# Patient Record
Sex: FEMALE | Race: WHITE | Employment: OTHER | URBAN - METROPOLITAN AREA
[De-identification: names, ages, dates, MRNs, and addresses within clinical notes are randomized per-mention and may not be internally consistent; named-entity substitution may affect disease eponyms.]

---

## 2017-01-05 ENCOUNTER — MED REC SCAN ONLY (OUTPATIENT)
Dept: INTERNAL MEDICINE CLINIC | Facility: CLINIC | Age: 77
End: 2017-01-05

## 2017-01-24 ENCOUNTER — APPOINTMENT (OUTPATIENT)
Dept: CT IMAGING | Facility: HOSPITAL | Age: 77
End: 2017-01-24
Attending: PHYSICIAN ASSISTANT
Payer: MEDICARE

## 2017-01-24 ENCOUNTER — HOSPITAL ENCOUNTER (EMERGENCY)
Facility: HOSPITAL | Age: 77
Discharge: HOME | End: 2017-01-24
Attending: EMERGENCY MEDICINE
Payer: MEDICARE

## 2017-01-24 VITALS
HEART RATE: 86 BPM | HEIGHT: 65 IN | WEIGHT: 167.56 LBS | RESPIRATION RATE: 18 BRPM | TEMPERATURE: 98 F | DIASTOLIC BLOOD PRESSURE: 72 MMHG | BODY MASS INDEX: 27.92 KG/M2 | SYSTOLIC BLOOD PRESSURE: 191 MMHG | OXYGEN SATURATION: 96 %

## 2017-01-24 DIAGNOSIS — S02.2XXA CLOSED FRACTURE OF NASAL BONE, INITIAL ENCOUNTER: Primary | ICD-10-CM

## 2017-01-24 DIAGNOSIS — S00.83XA FACIAL CONTUSION, INITIAL ENCOUNTER: ICD-10-CM

## 2017-01-24 DIAGNOSIS — S01.21XA NASAL LACERATION, INITIAL ENCOUNTER: ICD-10-CM

## 2017-01-24 DIAGNOSIS — S01.511A LIP LACERATION, INITIAL ENCOUNTER: ICD-10-CM

## 2017-01-24 PROCEDURE — 12013 RPR F/E/E/N/L/M 2.6-5.0 CM: CPT

## 2017-01-24 PROCEDURE — 76377 3D RENDER W/INTRP POSTPROCES: CPT

## 2017-01-24 PROCEDURE — 96375 TX/PRO/DX INJ NEW DRUG ADDON: CPT

## 2017-01-24 PROCEDURE — 96376 TX/PRO/DX INJ SAME DRUG ADON: CPT

## 2017-01-24 PROCEDURE — 72125 CT NECK SPINE W/O DYE: CPT

## 2017-01-24 PROCEDURE — 21310 HC CLOSED TX NASAL BONE FX WITHOUT MANIPULATION: CPT

## 2017-01-24 PROCEDURE — 70450 CT HEAD/BRAIN W/O DYE: CPT

## 2017-01-24 PROCEDURE — 21310 PR CLOSED TX NASAL BONE FX WITHOUT MANIPULATION: CPT

## 2017-01-24 PROCEDURE — 96374 THER/PROPH/DIAG INJ IV PUSH: CPT

## 2017-01-24 PROCEDURE — 99285 EMERGENCY DEPT VISIT HI MDM: CPT

## 2017-01-24 PROCEDURE — 99284 EMERGENCY DEPT VISIT MOD MDM: CPT

## 2017-01-24 PROCEDURE — 70486 CT MAXILLOFACIAL W/O DYE: CPT

## 2017-01-24 RX ORDER — MORPHINE SULFATE 4 MG/ML
4 INJECTION, SOLUTION INTRAMUSCULAR; INTRAVENOUS ONCE
Status: COMPLETED | OUTPATIENT
Start: 2017-01-24 | End: 2017-01-24

## 2017-01-24 RX ORDER — HYDROCODONE BITARTRATE AND ACETAMINOPHEN 10; 325 MG/1; MG/1
1-2 TABLET ORAL EVERY 4 HOURS PRN
Qty: 20 TABLET | Refills: 0 | Status: SHIPPED | OUTPATIENT
Start: 2017-01-24 | End: 2017-01-31

## 2017-01-24 RX ORDER — ONDANSETRON 2 MG/ML
4 INJECTION INTRAMUSCULAR; INTRAVENOUS ONCE
Status: COMPLETED | OUTPATIENT
Start: 2017-01-24 | End: 2017-01-24

## 2017-01-24 RX ORDER — MORPHINE SULFATE 2 MG/ML
2 INJECTION, SOLUTION INTRAMUSCULAR; INTRAVENOUS ONCE
Status: COMPLETED | OUTPATIENT
Start: 2017-01-24 | End: 2017-01-24

## 2017-01-24 RX ORDER — MORPHINE SULFATE 4 MG/ML
INJECTION, SOLUTION INTRAMUSCULAR; INTRAVENOUS
Status: DISCONTINUED
Start: 2017-01-24 | End: 2017-01-24

## 2017-01-24 RX ORDER — CEPHALEXIN 500 MG/1
500 CAPSULE ORAL 3 TIMES DAILY
Qty: 21 CAPSULE | Refills: 0 | Status: SHIPPED | OUTPATIENT
Start: 2017-01-24 | End: 2017-02-14

## 2017-01-24 RX ORDER — MORPHINE SULFATE 2 MG/ML
INJECTION, SOLUTION INTRAMUSCULAR; INTRAVENOUS
Status: DISCONTINUED
Start: 2017-01-24 | End: 2017-01-24

## 2017-01-24 RX ORDER — ONDANSETRON 2 MG/ML
INJECTION INTRAMUSCULAR; INTRAVENOUS
Status: DISCONTINUED
Start: 2017-01-24 | End: 2017-01-24

## 2017-01-24 NOTE — ED PROVIDER NOTES
Patient Seen in: [facility] Emergency Department    History   Patient presents with:  Fall (musculoskeletal, neurologic)    Stated Complaint: fall    HPI    Patient is a 56-year-old female.   Just prior to arrival, patient was attempting to ambulate Comment Surgeries for endometriosis    DELAY/SECTN FLAP LID,NOS,EAR,LIP N/A 5/2/2014    Comment Procedure: DIVISION AND INSET FOREHEAD FLAP;  Surgeon: Galilea Ivy MD;  Location: [address] 10.1-30 Right 5/2/201 carbidopa-levodopa (SINEMET)  MG Oral Tab,  Take 1 tablet by mouth 3 (three) times daily. AZILECT 0.5 MG Oral Tab,  Take 1 mg by mouth daily. VESICARE 10 MG Oral Tab,  Take 10 mg by mouth nightly.        Family History   Problem Relation Age of On or sign of infection. Neuro: Cranial nerves intact    ED Course   Labs Reviewed - No data to display  Ct Brain Or Head (82338)    1/24/2017  PROCEDURE:  CT OF THE BRAIN WITHOUT CONTRAST  COMPARISON:  None.   INDICATIONS:  fall  TECHNIQUE:  Noncontrast CT Dose information is transmitted to the  [facility] of Radiology) NRDR ([place] [address]) which includes the Dose Index Registry. PATIENT STATED HISTORY:  Patient tripped and fell into her table.   She was unsteady due to her histo techniques were used. Dose information is transmitted to the [employer] of Radiology) NRDR ([address]) which includes the Dose Index Registry. PATIENT STATED HISTORY:  Patient tripped and fell into her table.   She was uns scrubbed. Using 1% lidocaine with epinephrine, local injections were performed. 1.5 cc total utilized. Using 5-0 Vicryl, 3 simple interrupted sutures were placed. No tail. Edges were well approximated.   There is partial avulsion of the vermilion borde

## 2017-01-24 NOTE — ED INITIAL ASSESSMENT (HPI)
Patient to ED via EMS. Reports she was walking, was unsteady d/t her Parkinsons, fell and hit her face on her dining room table.   Patient walked across the porter to friends apartment and waited there for ambulance.  + nose pain, right clavicle pain, left k

## 2017-01-25 NOTE — ED NOTES
Assumed care, pt resting in bed, family at bedside. She is in no apparent distress. States, \"pain is OK. \" When asked if vision is OK, \"I can see them, my family here. \" NICOLLE Steiner at bedside answering family questions

## 2017-01-25 NOTE — ED PROVIDER NOTES
Patient Seen in: [facility] Emergency Department    History   Patient presents with:  Fall (musculoskeletal, neurologic)    Stated Complaint: fall    HPI    43-year-old female with a history of Parkinson's, sleep apnea, hyperlipidemia, basal cell ca EGD for GERD    OTHER SURGICAL HISTORY  [place]    Comment Partial colectomy for diverticulitis    OTHER SURGICAL HISTORY      Comment Surgeries for endometriosis    DELAY/SECTN FLAP LID,NOS,EAR,LIP N/A 5/2/2014    Comment Procedure: DIVISION AND INSET Elma Landrum Calcium (CRESTOR) 5 MG Oral Tab,  Take 5 mg by mouth nightly. Polyethyl Glycol-Propyl Glycol (SYSTANE OP),  Apply  to eye.   carbidopa-levodopa (SINEMET)  MG Oral Tab,  Take 1 tablet by mouth 3 (three) times daily.    AZILECT 0.5 MG Oral Tab,  Take membranes are clear without evidence of injection or perforation. Neck exam: Supple. There is no lymphadenopathy or carotid bruit. Cardiovascular exam: Regular rate and rhythm. There are no murmurs, rubs, gallops.   Lungs: Clear to auscultation bilatera acute inflammation. SKULL:             No calvarial fracture. However, comminuted bilateral nasal bone fractures with displacement of small fracture fragments. OTHER:             Atherosclerosis. Soft tissue swelling overlies the left frontal calvarium. changes present. For example, the temporomandibular joints bilaterally are narrowed with flattening of the condyles. Small well-corticated ossifications anterior to the left TMJ may represent prior trauma, correlate clinically.   There is soft tissue swelli 1/24/2017 at 17:44     Approved by: Valentina Grant MD              Patient C-spine collar was removed. Patient had a sepsis of her wounds. She had topical lidocaine applied. Please refer to the physician assistant's note for suture repair.     Patient noted

## 2017-02-14 ENCOUNTER — SNF VISIT (OUTPATIENT)
Dept: INTERNAL MEDICINE CLINIC | Facility: CLINIC | Age: 77
End: 2017-02-14

## 2017-02-14 DIAGNOSIS — I95.1 ORTHOSTATIC HYPOTENSION: ICD-10-CM

## 2017-02-14 DIAGNOSIS — K21.9 GASTROESOPHAGEAL REFLUX DISEASE, ESOPHAGITIS PRESENCE NOT SPECIFIED: ICD-10-CM

## 2017-02-14 DIAGNOSIS — F32.A DEPRESSION, UNSPECIFIED DEPRESSION TYPE: ICD-10-CM
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
## 2017-02-14 DIAGNOSIS — E03.9 ACQUIRED HYPOTHYROIDISM: ICD-10-CM

## 2017-02-14 DIAGNOSIS — G20 PARKINSON DISEASE (HCC): Primary | ICD-10-CM

## 2017-02-14 PROCEDURE — 99306 1ST NF CARE HIGH MDM 50: CPT | Performed by: INTERNAL MEDICINE

## 2017-02-14 RX ORDER — RIVASTIGMINE 9.5 MG/24H
1 PATCH, EXTENDED RELEASE TRANSDERMAL DAILY
COMMUNITY

## 2017-02-14 RX ORDER — PYRIDOSTIGMINE BROMIDE 60 MG/1
30 TABLET ORAL 2 TIMES DAILY
COMMUNITY

## 2017-02-14 RX ORDER — BUPROPION HYDROCHLORIDE 150 MG/1
150 TABLET ORAL DAILY
COMMUNITY

## 2017-02-14 RX ORDER — SODIUM CHLORIDE 1000 MG
1 TABLET, SOLUBLE MISCELLANEOUS 2 TIMES DAILY WITH MEALS
COMMUNITY
End: 2020-01-30 | Stop reason: ALTCHOICE

## 2017-02-14 NOTE — PROGRESS NOTES
Patient admitted to the [facility] today for the second floor enriched living. She typically resides at [facility] in independent living.   The patient has a history of Parkinson's disease and has had multiple falls in addition to orthostatic hypotension acute distress, mental status at baseline, not short of breath  Skin: Warm, dry, intact no rashes visualized  HEENT: Normocephalic, she has a scar present over the bridge of her nose.   No pharyngeal erythema or exudate  Neck: No lymphadenopathy or thyromeg

## 2017-03-16 ENCOUNTER — SNF VISIT (OUTPATIENT)
Dept: INTERNAL MEDICINE CLINIC | Facility: CLINIC | Age: 77
End: 2017-03-16

## 2017-03-16 DIAGNOSIS — R35.1 NOCTURIA: ICD-10-CM

## 2017-03-16 DIAGNOSIS — G20 PARKINSON DISEASE (HCC): Primary | ICD-10-CM

## 2017-03-16 DIAGNOSIS — I95.1 ORTHOSTATIC HYPOTENSION: ICD-10-CM

## 2017-03-16 DIAGNOSIS — F32.A DEPRESSION, UNSPECIFIED DEPRESSION TYPE: ICD-10-CM

## 2017-03-16 PROCEDURE — 99307 SBSQ NF CARE SF MDM 10: CPT | Performed by: INTERNAL MEDICINE

## 2017-03-16 NOTE — PROGRESS NOTES
Patient was seen in follow-up for her Parkinson's disease, orthostatic hypotension, urinary incontinence and nocturia, depression, and recurrent falls. The patient was currently sitting out in the dining area.   She was recently switched from [address] [place] [address]

## 2017-04-20 ENCOUNTER — SNF VISIT (OUTPATIENT)
Dept: INTERNAL MEDICINE CLINIC | Facility: CLINIC | Age: 77
End: 2017-04-20

## 2017-04-20 DIAGNOSIS — G20 PARKINSON DISEASE (HCC): Primary | ICD-10-CM

## 2017-04-20 DIAGNOSIS — F32.A DEPRESSION, UNSPECIFIED DEPRESSION TYPE: ICD-10-CM

## 2017-04-20 DIAGNOSIS — I95.1 ORTHOSTATIC HYPOTENSION: ICD-10-CM

## 2017-04-20 PROCEDURE — 99308 SBSQ NF CARE LOW MDM 20: CPT | Performed by: INTERNAL MEDICINE

## 2017-04-20 NOTE — PROGRESS NOTES
Patient was seen in follow-up for her Parkinson's disease, recurrent falls, depression, and orthostatic hypotension. She was currently sitting up in a chair with her legs resting on her Rollator and she was asleep. She was easily aroused to voice.   She f

## 2017-04-24 ENCOUNTER — SNF VISIT (OUTPATIENT)
Dept: INTERNAL MEDICINE CLINIC | Facility: CLINIC | Age: 77
End: 2017-04-24

## 2017-04-24 DIAGNOSIS — N30.00 ACUTE CYSTITIS WITHOUT HEMATURIA: ICD-10-CM

## 2017-04-24 DIAGNOSIS — S09.90XA CLOSED HEAD INJURY, INITIAL ENCOUNTER: ICD-10-CM
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
## 2017-04-24 PROCEDURE — 99310 SBSQ NF CARE HIGH MDM 45: CPT | Performed by: INTERNAL MEDICINE

## 2017-04-24 NOTE — PROGRESS NOTES
Came to the floor immediately after the patient apparently fell in her room striking the back of her head on the floor. She is fallen approximately 6 times in the past week.   She did have a urine specimen obtained and the culture came back sometime last e

## 2017-05-12 ENCOUNTER — SNF VISIT (OUTPATIENT)
Dept: INTERNAL MEDICINE CLINIC | Facility: CLINIC | Age: 77
End: 2017-05-12

## 2017-05-12 DIAGNOSIS — M70.21 OLECRANON BURSITIS OF RIGHT ELBOW: Primary | ICD-10-CM

## 2017-05-12 NOTE — PROGRESS NOTES
Asked by nursing to evaluate the patient's right elbow which was swollen. No trauma or injury noted. Patient was sitting in her recliner. She does have swelling present over the right olecranon.   There is no warmth redness or tenderness to palpation of

## 2017-10-20 ENCOUNTER — HOSPITAL ENCOUNTER (EMERGENCY)
Facility: HOSPITAL | Age: 77
Discharge: HOME | End: 2017-10-20
Attending: EMERGENCY MEDICINE
Payer: MEDICARE

## 2017-10-20 ENCOUNTER — APPOINTMENT (OUTPATIENT)
Dept: CT IMAGING | Facility: HOSPITAL | Age: 77
End: 2017-10-20
Attending: EMERGENCY MEDICINE
Payer: MEDICARE

## 2017-10-20 VITALS
HEART RATE: 83 BPM | SYSTOLIC BLOOD PRESSURE: 136 MMHG | DIASTOLIC BLOOD PRESSURE: 59 MMHG | OXYGEN SATURATION: 97 % | RESPIRATION RATE: 18 BRPM | TEMPERATURE: 97 F
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
## 2017-10-20 DIAGNOSIS — S00.03XA CONTUSION OF SCALP, INITIAL ENCOUNTER: ICD-10-CM
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
## 2017-10-20 PROCEDURE — 70450 CT HEAD/BRAIN W/O DYE: CPT | Performed by: EMERGENCY MEDICINE

## 2017-10-20 PROCEDURE — 96374 THER/PROPH/DIAG INJ IV PUSH: CPT

## 2017-10-20 PROCEDURE — 80053 COMPREHEN METABOLIC PANEL: CPT | Performed by: EMERGENCY MEDICINE

## 2017-10-20 PROCEDURE — 99284 EMERGENCY DEPT VISIT MOD MDM: CPT

## 2017-10-20 PROCEDURE — 85025 COMPLETE CBC W/AUTO DIFF WBC: CPT | Performed by: EMERGENCY MEDICINE

## 2017-10-20 RX ORDER — ONDANSETRON 2 MG/ML
4 INJECTION INTRAMUSCULAR; INTRAVENOUS ONCE
Status: COMPLETED | OUTPATIENT
Start: 2017-10-20 | End: 2017-10-20

## 2017-10-20 NOTE — ED PROVIDER NOTES
Patient Seen in: [facility] Emergency Department    History   Patient presents with:  Headache (neurologic)  Hypertension (cardiovascular)  Contusion (musculoskeletal)    Stated Complaint: fall/ ha htn    HPI    Patient is a pleasant 61-year-old fem HAVE EXPERIENCED ANY* Right      Comment: Procedure: TISSUE REARRANGEMENT CHEEK;                 Surgeon: Cande Snider MD;  Location: [address] [place] [address]  5/2/2014: PATIENT WITH PREOPERATIVE ORDER FOR IV ANTIBIO* Right      Comment: P cart, in no apparent distress. HEENT: Mid forehead contusion. Extraocular muscles are intact. Pupils are equal and reactive to light. Oropharynx is pink and moist.  NECK: Neck is supple and nontender. The trachea is midline.    LUNGS: Lungs are clear to a techniques were used. Dose information is transmitted to the [facility] [employer] of Radiology) NRDR ([address]) which includes the Dose Index Registry.   PATIENT STATED HISTORY: (As transcribed by Technologist)  Patient presents sta worsening symptoms, or as discussed. Patient and family verbalized understanding and are comfortable with the plan as recommended. Patient was subsequently discharged without incident.     Disposition and Plan     Clinical Impression:  Fall at nursing h

## 2017-11-30 ENCOUNTER — NURSE ONLY (OUTPATIENT)
Dept: LAB | Age: 77
End: 2017-11-30
Attending: INTERNAL MEDICINE
Payer: MEDICARE

## 2017-11-30 DIAGNOSIS — E78.5 HYPERLIPEMIA: ICD-10-CM

## 2017-11-30 DIAGNOSIS — D62 ACUTE BLOOD LOSS ANEMIA: Primary | ICD-10-CM

## 2017-11-30 PROCEDURE — 80061 LIPID PANEL: CPT

## 2017-11-30 PROCEDURE — 85025 COMPLETE CBC W/AUTO DIFF WBC: CPT

## 2017-11-30 PROCEDURE — 80053 COMPREHEN METABOLIC PANEL: CPT

## 2018-01-17 ENCOUNTER — NURSE ONLY (OUTPATIENT)
Dept: LAB | Age: 78
End: 2018-01-17
Attending: INTERNAL MEDICINE
Payer: MEDICARE

## 2018-01-17 DIAGNOSIS — R53.1 WEAKNESS: Primary | ICD-10-CM

## 2018-01-17 DIAGNOSIS — E78.5 LIPIDEMIA: ICD-10-CM

## 2018-01-17 DIAGNOSIS — E03.9 HYPOTHYROIDISM: ICD-10-CM

## 2018-01-17 DIAGNOSIS — E55.9 VITAMIN D DEFICIENCY: ICD-10-CM

## 2018-01-17 LAB
25-HYDROXYVITAMIN D (TOTAL): 43.1 NG/ML (ref 30–100)
ALBUMIN SERPL-MCNC: 3.5 G/DL (ref 3.5–4.8)
ALP LIVER SERPL-CCNC: 45 U/L (ref 55–142)
ALT SERPL-CCNC: 12 U/L (ref 14–54)
AST SERPL-CCNC: 11 U/L (ref 15–41)
BASOPHILS # BLD AUTO: 0.07 X10(3) UL (ref 0–0.1)
BASOPHILS NFR BLD AUTO: 1.4 %
BILIRUB SERPL-MCNC: 0.5 MG/DL (ref 0.1–2)
BUN BLD-MCNC: 16 MG/DL (ref 8–20)
CALCIUM BLD-MCNC: 9 MG/DL (ref 8.3–10.3)
CHLORIDE: 107 MMOL/L (ref 101–111)
CHOLEST SMN-MCNC: 221 MG/DL (ref ?–200)
CO2: 29 MMOL/L (ref 22–32)
CREAT BLD-MCNC: 0.88 MG/DL (ref 0.55–1.02)
EOSINOPHIL # BLD AUTO: 0.18 X10(3) UL (ref 0–0.3)
EOSINOPHIL NFR BLD AUTO: 3.6 %
ERYTHROCYTE [DISTWIDTH] IN BLOOD BY AUTOMATED COUNT: 11.2 % (ref 11.5–16)
GLUCOSE BLD-MCNC: 77 MG/DL (ref 70–99)
HCT VFR BLD AUTO: 38 % (ref 34–50)
HDLC SERPL-MCNC: 38 MG/DL (ref 45–?)
HDLC SERPL: 5.82 {RATIO} (ref ?–4.44)
HGB BLD-MCNC: 12.5 G/DL (ref 12–16)
IMMATURE GRANULOCYTE COUNT: 0.01 X10(3) UL (ref 0–1)
IMMATURE GRANULOCYTE RATIO %: 0.2 %
LDLC SERPL CALC-MCNC: 146 MG/DL (ref ?–130)
LYMPHOCYTES # BLD AUTO: 1.42 X10(3) UL (ref 0.9–4)
LYMPHOCYTES NFR BLD AUTO: 28.7 %
M PROTEIN MFR SERPL ELPH: 6.5 G/DL (ref 6.1–8.3)
MCH RBC QN AUTO: 30.9 PG (ref 27–33.2)
MCHC RBC AUTO-ENTMCNC: 32.9 G/DL (ref 31–37)
MCV RBC AUTO: 93.8 FL (ref 81–100)
MONOCYTES # BLD AUTO: 0.55 X10(3) UL (ref 0.1–0.6)
MONOCYTES NFR BLD AUTO: 11.1 %
NEUTROPHIL ABS PRELIM: 2.71 X10 (3) UL (ref 1.3–6.7)
NEUTROPHILS # BLD AUTO: 2.71 X10(3) UL (ref 1.3–6.7)
NEUTROPHILS NFR BLD AUTO: 55 %
NONHDLC SERPL-MCNC: 183 MG/DL (ref ?–130)
PLATELET # BLD AUTO: 123 10(3)UL (ref 150–450)
POTASSIUM SERPL-SCNC: 3.9 MMOL/L (ref 3.6–5.1)
RBC # BLD AUTO: 4.05 X10(6)UL (ref 3.8–5.1)
RED CELL DISTRIBUTION WIDTH-SD: 38.3 FL (ref 35.1–46.3)
SODIUM SERPL-SCNC: 144 MMOL/L (ref 136–144)
TRIGL SERPL-MCNC: 184 MG/DL (ref ?–150)
TSI SER-ACNC: 0.49 MIU/ML (ref 0.35–5.5)
VLDLC SERPL CALC-MCNC: 37 MG/DL (ref 5–40)
WBC # BLD AUTO: 4.9 X10(3) UL (ref 4–13)

## 2018-01-17 PROCEDURE — 80061 LIPID PANEL: CPT

## 2018-01-17 PROCEDURE — 80053 COMPREHEN METABOLIC PANEL: CPT

## 2018-01-17 PROCEDURE — 85025 COMPLETE CBC W/AUTO DIFF WBC: CPT

## 2018-01-17 PROCEDURE — 82306 VITAMIN D 25 HYDROXY: CPT

## 2018-01-17 PROCEDURE — 84443 ASSAY THYROID STIM HORMONE: CPT

## 2018-01-31 ENCOUNTER — NURSE ONLY (OUTPATIENT)
Dept: LAB | Age: 78
End: 2018-01-31
Attending: INTERNAL MEDICINE
Payer: MEDICARE

## 2018-01-31 DIAGNOSIS — R69 DIAGNOSIS UNKNOWN: Primary | ICD-10-CM

## 2018-01-31 LAB
25-HYDROXYVITAMIN D (TOTAL): 52.6 NG/ML (ref 30–100)
ALBUMIN SERPL-MCNC: 3.9 G/DL (ref 3.5–4.8)
ALP LIVER SERPL-CCNC: 59 U/L (ref 55–142)
ALT SERPL-CCNC: 12 U/L (ref 14–54)
AST SERPL-CCNC: 12 U/L (ref 15–41)
BILIRUB SERPL-MCNC: 0.7 MG/DL (ref 0.1–2)
BUN BLD-MCNC: 18 MG/DL (ref 8–20)
CALCIUM BLD-MCNC: 9.5 MG/DL (ref 8.3–10.3)
CHLORIDE: 106 MMOL/L (ref 101–111)
CHOLEST SMN-MCNC: 246 MG/DL (ref ?–200)
CO2: 29 MMOL/L (ref 22–32)
CREAT BLD-MCNC: 1.08 MG/DL (ref 0.55–1.02)
FREE T4: 1.2 NG/DL (ref 0.9–1.8)
GLUCOSE BLD-MCNC: 124 MG/DL (ref 70–99)
HDLC SERPL-MCNC: 41 MG/DL (ref 45–?)
HDLC SERPL: 6 {RATIO} (ref ?–4.44)
LDLC SERPL CALC-MCNC: 171 MG/DL (ref ?–130)
M PROTEIN MFR SERPL ELPH: 7.1 G/DL (ref 6.1–8.3)
NONHDLC SERPL-MCNC: 205 MG/DL (ref ?–130)
POTASSIUM SERPL-SCNC: 4.3 MMOL/L (ref 3.6–5.1)
SODIUM SERPL-SCNC: 142 MMOL/L (ref 136–144)
TRIGL SERPL-MCNC: 171 MG/DL (ref ?–150)
TSI SER-ACNC: 0.35 MIU/ML (ref 0.35–5.5)
VLDLC SERPL CALC-MCNC: 34 MG/DL (ref 5–40)

## 2018-01-31 PROCEDURE — 82306 VITAMIN D 25 HYDROXY: CPT

## 2018-01-31 PROCEDURE — 84443 ASSAY THYROID STIM HORMONE: CPT

## 2018-01-31 PROCEDURE — 84439 ASSAY OF FREE THYROXINE: CPT

## 2018-01-31 PROCEDURE — 80053 COMPREHEN METABOLIC PANEL: CPT

## 2018-01-31 PROCEDURE — 80061 LIPID PANEL: CPT

## 2018-03-16 ENCOUNTER — NURSE ONLY (OUTPATIENT)
Dept: LAB | Age: 78
End: 2018-03-16
Attending: INTERNAL MEDICINE
Payer: MEDICARE

## 2018-03-16 DIAGNOSIS — R69 DIAGNOSIS UNKNOWN: Primary | ICD-10-CM

## 2018-03-16 LAB
BILIRUB UR QL STRIP.AUTO: NEGATIVE
COLOR UR AUTO: YELLOW
GLUCOSE UR STRIP.AUTO-MCNC: NEGATIVE MG/DL
NITRITE UR QL STRIP.AUTO: NEGATIVE
PH UR STRIP.AUTO: 5 [PH] (ref 4.5–8)
PROT UR STRIP.AUTO-MCNC: NEGATIVE MG/DL
RBC UR QL AUTO: NEGATIVE
SP GR UR STRIP.AUTO: 1.01 (ref 1–1.03)
UROBILINOGEN UR STRIP.AUTO-MCNC: <2 MG/DL

## 2018-03-16 PROCEDURE — 87186 SC STD MICRODIL/AGAR DIL: CPT

## 2018-03-16 PROCEDURE — 87088 URINE BACTERIA CULTURE: CPT

## 2018-03-16 PROCEDURE — 87086 URINE CULTURE/COLONY COUNT: CPT

## 2018-03-16 PROCEDURE — 81001 URINALYSIS AUTO W/SCOPE: CPT

## 2018-03-19 ENCOUNTER — NURSE ONLY (OUTPATIENT)
Dept: LAB | Age: 78
End: 2018-03-19
Attending: INTERNAL MEDICINE
Payer: MEDICARE

## 2018-03-19 DIAGNOSIS — R69 DIAGNOSIS UNKNOWN: Primary | ICD-10-CM

## 2018-03-19 LAB
FREE T4: 1.2 NG/DL (ref 0.9–1.8)
TSI SER-ACNC: 0.32 MIU/ML (ref 0.35–5.5)

## 2018-03-19 PROCEDURE — 84443 ASSAY THYROID STIM HORMONE: CPT

## 2018-03-19 PROCEDURE — 84439 ASSAY OF FREE THYROXINE: CPT

## 2018-04-09 ENCOUNTER — NURSE ONLY (OUTPATIENT)
Dept: LAB | Age: 78
End: 2018-04-09
Attending: INTERNAL MEDICINE
Payer: MEDICARE

## 2018-04-09 ENCOUNTER — APPOINTMENT (OUTPATIENT)
Dept: LAB | Age: 78
End: 2018-04-09
Attending: INTERNAL MEDICINE
Payer: MEDICARE

## 2018-04-09 DIAGNOSIS — E03.9 HYPOTHYROIDISM, ADULT: Primary | ICD-10-CM

## 2018-04-09 PROCEDURE — 84439 ASSAY OF FREE THYROXINE: CPT

## 2018-04-09 PROCEDURE — 84443 ASSAY THYROID STIM HORMONE: CPT

## 2018-04-17 ENCOUNTER — NURSE ONLY (OUTPATIENT)
Dept: LAB | Age: 78
End: 2018-04-17
Attending: INTERNAL MEDICINE
Payer: MEDICARE

## 2018-04-17 DIAGNOSIS — R69 DIAGNOSIS UNKNOWN: Primary | ICD-10-CM

## 2018-04-17 PROCEDURE — 85025 COMPLETE CBC W/AUTO DIFF WBC: CPT

## 2018-04-17 PROCEDURE — 80061 LIPID PANEL: CPT

## 2018-04-17 PROCEDURE — 84439 ASSAY OF FREE THYROXINE: CPT

## 2018-04-17 PROCEDURE — 84443 ASSAY THYROID STIM HORMONE: CPT

## 2018-04-17 PROCEDURE — 82306 VITAMIN D 25 HYDROXY: CPT

## 2018-04-17 PROCEDURE — 80053 COMPREHEN METABOLIC PANEL: CPT

## 2018-06-20 ENCOUNTER — NURSE ONLY (OUTPATIENT)
Dept: LAB | Age: 78
End: 2018-06-20
Attending: INTERNAL MEDICINE
Payer: MEDICARE

## 2018-06-20 DIAGNOSIS — R69 DIAGNOSIS UNKNOWN: Primary | ICD-10-CM

## 2018-06-20 PROCEDURE — 80061 LIPID PANEL: CPT

## 2018-06-20 PROCEDURE — 84443 ASSAY THYROID STIM HORMONE: CPT

## 2018-08-15 ENCOUNTER — NURSE ONLY (OUTPATIENT)
Dept: LAB | Age: 78
End: 2018-08-15
Attending: INTERNAL MEDICINE
Payer: MEDICARE

## 2018-08-15 DIAGNOSIS — R69 DIAGNOSIS UNKNOWN: Primary | ICD-10-CM

## 2018-08-15 LAB
ALBUMIN SERPL-MCNC: 3.6 G/DL (ref 3.5–4.8)
ALBUMIN/GLOB SERPL: 1.3 {RATIO} (ref 1–2)
ALP LIVER SERPL-CCNC: 51 U/L (ref 55–142)
ALT SERPL-CCNC: 10 U/L (ref 14–54)
ANION GAP SERPL CALC-SCNC: 6 MMOL/L (ref 0–18)
AST SERPL-CCNC: 9 U/L (ref 15–41)
BILIRUB SERPL-MCNC: 0.6 MG/DL (ref 0.1–2)
BUN BLD-MCNC: 18 MG/DL (ref 8–20)
BUN/CREAT SERPL: 19.4 (ref 10–20)
CALCIUM BLD-MCNC: 9.3 MG/DL (ref 8.3–10.3)
CHLORIDE SERPL-SCNC: 108 MMOL/L (ref 101–111)
CHOLEST SMN-MCNC: 217 MG/DL (ref ?–200)
CO2 SERPL-SCNC: 31 MMOL/L (ref 22–32)
CREAT BLD-MCNC: 0.93 MG/DL (ref 0.55–1.02)
ERYTHROCYTE [DISTWIDTH] IN BLOOD BY AUTOMATED COUNT: 12.8 % (ref 11.5–16)
EST. AVERAGE GLUCOSE BLD GHB EST-MCNC: 105 MG/DL (ref 68–126)
FOLATE SERPL-MCNC: 59.8 NG/ML (ref 5.9–?)
GLOBULIN PLAS-MCNC: 2.7 G/DL (ref 2.5–3.7)
GLUCOSE BLD-MCNC: 78 MG/DL (ref 70–99)
HAV AB SERPL IA-ACNC: 906 PG/ML (ref 193–986)
HBA1C MFR BLD HPLC: 5.3 % (ref ?–5.7)
HCT VFR BLD AUTO: 36.5 % (ref 34–50)
HDLC SERPL-MCNC: 37 MG/DL (ref 40–59)
HGB BLD-MCNC: 11.8 G/DL (ref 12–16)
LDLC SERPL CALC-MCNC: 146 MG/DL (ref ?–100)
M PROTEIN MFR SERPL ELPH: 6.3 G/DL (ref 6.1–8.3)
MCH RBC QN AUTO: 31.1 PG (ref 27–33.2)
MCHC RBC AUTO-ENTMCNC: 32.3 G/DL (ref 31–37)
MCV RBC AUTO: 96.3 FL (ref 81–100)
NONHDLC SERPL-MCNC: 180 MG/DL (ref ?–130)
OSMOLALITY SERPL CALC.SUM OF ELEC: 301 MOSM/KG (ref 275–295)
PLATELET # BLD AUTO: 105 10(3)UL (ref 150–450)
POTASSIUM SERPL-SCNC: 3.8 MMOL/L (ref 3.6–5.1)
RBC # BLD AUTO: 3.79 X10(6)UL (ref 3.8–5.1)
RED CELL DISTRIBUTION WIDTH-SD: 45.1 FL (ref 35.1–46.3)
SODIUM SERPL-SCNC: 145 MMOL/L (ref 136–144)
T4 FREE SERPL-MCNC: 1 NG/DL (ref 0.9–1.8)
TRIGL SERPL-MCNC: 172 MG/DL (ref 30–149)
TSI SER-ACNC: 1.67 MIU/ML (ref 0.35–5.5)
VLDLC SERPL CALC-MCNC: 34 MG/DL (ref 0–30)
WBC # BLD AUTO: 4.9 X10(3) UL (ref 4–13)

## 2018-08-15 PROCEDURE — 80053 COMPREHEN METABOLIC PANEL: CPT

## 2018-08-15 PROCEDURE — 84439 ASSAY OF FREE THYROXINE: CPT

## 2018-08-15 PROCEDURE — 83036 HEMOGLOBIN GLYCOSYLATED A1C: CPT

## 2018-08-15 PROCEDURE — 84443 ASSAY THYROID STIM HORMONE: CPT

## 2018-08-15 PROCEDURE — 82746 ASSAY OF FOLIC ACID SERUM: CPT

## 2018-08-15 PROCEDURE — 85027 COMPLETE CBC AUTOMATED: CPT

## 2018-08-15 PROCEDURE — 82607 VITAMIN B-12: CPT

## 2018-08-15 PROCEDURE — 80061 LIPID PANEL: CPT

## 2018-09-06 PROBLEM — F32.9 MAJOR DEPRESSIVE DISORDER: Status: ACTIVE | Noted: 2018-09-06

## 2018-09-06 PROBLEM — G20 PARKINSON'S DISEASE (HCC): Status: ACTIVE | Noted: 2018-09-06

## 2018-09-06 PROBLEM — E78.5 HYPERLIPIDEMIA: Status: ACTIVE | Noted: 2018-09-06

## 2018-09-06 PROBLEM — I10 ESSENTIAL HYPERTENSION: Status: ACTIVE | Noted: 2018-09-06

## 2018-09-06 PROBLEM — E03.9 HYPOTHYROIDISM: Status: ACTIVE | Noted: 2018-09-06

## 2018-10-29 PROBLEM — H00.14 CHALAZION LEFT UPPER EYELID: Status: ACTIVE | Noted: 2018-10-29

## 2018-11-15 ENCOUNTER — NURSE ONLY (OUTPATIENT)
Dept: LAB | Age: 78
End: 2018-11-15
Attending: INTERNAL MEDICINE
Payer: MEDICARE

## 2018-11-15 DIAGNOSIS — R69 DIAGNOSIS UNKNOWN: Primary | ICD-10-CM

## 2018-11-15 PROCEDURE — 80053 COMPREHEN METABOLIC PANEL: CPT

## 2018-11-15 PROCEDURE — 85025 COMPLETE CBC W/AUTO DIFF WBC: CPT

## 2019-06-27 ENCOUNTER — NURSE ONLY (OUTPATIENT)
Dept: LAB | Age: 79
End: 2019-06-27
Attending: INTERNAL MEDICINE
Payer: MEDICARE

## 2019-06-27 DIAGNOSIS — E78.5 HYPERLIPEMIA: ICD-10-CM

## 2019-06-27 DIAGNOSIS — E11.9 DIABETES MELLITUS (HCC): Primary | ICD-10-CM

## 2019-06-27 DIAGNOSIS — E55.9 AVITAMINOSIS D: ICD-10-CM

## 2019-06-27 PROCEDURE — 80061 LIPID PANEL: CPT

## 2019-06-27 PROCEDURE — 83036 HEMOGLOBIN GLYCOSYLATED A1C: CPT

## 2019-06-27 PROCEDURE — 85027 COMPLETE CBC AUTOMATED: CPT

## 2019-06-27 PROCEDURE — 80053 COMPREHEN METABOLIC PANEL: CPT

## 2019-06-27 PROCEDURE — 82607 VITAMIN B-12: CPT

## 2019-06-27 PROCEDURE — 84443 ASSAY THYROID STIM HORMONE: CPT

## 2019-06-27 PROCEDURE — 82746 ASSAY OF FOLIC ACID SERUM: CPT

## 2019-06-27 PROCEDURE — 82306 VITAMIN D 25 HYDROXY: CPT

## 2019-06-27 PROCEDURE — 84439 ASSAY OF FREE THYROXINE: CPT

## 2019-10-20 ENCOUNTER — APPOINTMENT (OUTPATIENT)
Dept: GENERAL RADIOLOGY | Facility: HOSPITAL | Age: 79
End: 2019-10-20
Attending: EMERGENCY MEDICINE
Payer: MEDICARE

## 2019-10-20 ENCOUNTER — APPOINTMENT (OUTPATIENT)
Dept: CT IMAGING | Facility: HOSPITAL | Age: 79
End: 2019-10-20
Attending: EMERGENCY MEDICINE
Payer: MEDICARE

## 2019-10-20 PROCEDURE — 70450 CT HEAD/BRAIN W/O DYE: CPT | Performed by: EMERGENCY MEDICINE

## 2019-10-20 PROCEDURE — 72125 CT NECK SPINE W/O DYE: CPT | Performed by: EMERGENCY MEDICINE

## 2019-10-20 PROCEDURE — 73030 X-RAY EXAM OF SHOULDER: CPT | Performed by: EMERGENCY MEDICINE

## 2019-10-20 NOTE — ED PROVIDER NOTES
Patient Seen in: [facility] Emergency Department      History   Patient presents with:  Fall (musculoskeletal, neurologic)    Stated Complaint: fall, facial injuries    HPI    43-year-old woman with Parkinson's disease/dementia who comes in from [facility] Dr. Merlene Montana (abnormal CT, epig pain): mild amt retained gastric contents                    Social History    Tobacco Use      Smoking status: Never Smoker      Smokeless tobacco: Never Used    Alcohol use: No    Drug use:  No             Review of Systems DIFFERENTIAL[[id number]]                              Final result                 Please view results for these tests on the individual orders.    RAINBOW DRAW BLUE   RAINBOW DRAW LAVENDER   RAINBOW DRAW LIGHT GREEN   RAINBOW DRAW GOLD   CBC W/ DIFFERENTIAL Medication List as of 10/20/2019  7:01 PM

## 2019-10-20 NOTE — ED NOTES
Pt son (poa) contacted.   States progressive parkinson,  Usually alert but not always verbal,   Is in nursing home total care

## 2019-10-20 NOTE — ED INITIAL ASSESSMENT (HPI)
Unwitnessed fall at nursing home,  Pt states lost balance,   Contusions, to rt mouth and forehead,   No loc,  Alert to norm

## 2019-12-27 ENCOUNTER — NURSE ONLY (OUTPATIENT)
Dept: LAB | Age: 79
End: 2019-12-27
Attending: INTERNAL MEDICINE
Payer: MEDICARE

## 2019-12-27 DIAGNOSIS — G20 PARKINSON DISEASE (HCC): Primary | ICD-10-CM

## 2019-12-27 PROCEDURE — 83036 HEMOGLOBIN GLYCOSYLATED A1C: CPT

## 2019-12-27 PROCEDURE — 80053 COMPREHEN METABOLIC PANEL: CPT

## 2019-12-27 PROCEDURE — 80061 LIPID PANEL: CPT

## 2019-12-27 PROCEDURE — 84443 ASSAY THYROID STIM HORMONE: CPT

## 2019-12-27 PROCEDURE — 82746 ASSAY OF FOLIC ACID SERUM: CPT

## 2019-12-27 PROCEDURE — 85025 COMPLETE CBC W/AUTO DIFF WBC: CPT

## 2019-12-27 PROCEDURE — 82306 VITAMIN D 25 HYDROXY: CPT

## 2019-12-27 PROCEDURE — 82607 VITAMIN B-12: CPT

## 2019-12-27 PROCEDURE — 84436 ASSAY OF TOTAL THYROXINE: CPT

## 2020-03-27 ENCOUNTER — NURSE ONLY (OUTPATIENT)
Dept: LAB | Age: 80
End: 2020-03-27
Attending: INTERNAL MEDICINE
Payer: MEDICARE

## 2020-03-27 DIAGNOSIS — D68.52 HETEROZYGOUS FOR PROTHROMBIN G20210A MUTATION (HCC): Primary | ICD-10-CM

## 2020-03-27 LAB
ALBUMIN SERPL-MCNC: 3.3 G/DL (ref 3.4–5)
ALBUMIN/GLOB SERPL: 1 {RATIO} (ref 1–2)
ALP LIVER SERPL-CCNC: 46 U/L (ref 55–142)
ALT SERPL-CCNC: 11 U/L (ref 13–56)
ANION GAP SERPL CALC-SCNC: 3 MMOL/L (ref 0–18)
AST SERPL-CCNC: 10 U/L (ref 15–37)
BASOPHILS # BLD AUTO: 0.08 X10(3) UL (ref 0–0.2)
BASOPHILS NFR BLD AUTO: 1.5 %
BILIRUB SERPL-MCNC: 0.5 MG/DL (ref 0.1–2)
BUN BLD-MCNC: 20 MG/DL (ref 7–18)
BUN/CREAT SERPL: 22.5 (ref 10–20)
CALCIUM BLD-MCNC: 9.3 MG/DL (ref 8.5–10.1)
CHLORIDE SERPL-SCNC: 106 MMOL/L (ref 98–112)
CO2 SERPL-SCNC: 31 MMOL/L (ref 21–32)
CREAT BLD-MCNC: 0.89 MG/DL (ref 0.55–1.02)
DEPRECATED RDW RBC AUTO: 44.4 FL (ref 35.1–46.3)
EOSINOPHIL # BLD AUTO: 0.15 X10(3) UL (ref 0–0.7)
EOSINOPHIL NFR BLD AUTO: 2.8 %
ERYTHROCYTE [DISTWIDTH] IN BLOOD BY AUTOMATED COUNT: 12.1 % (ref 11–15)
GLOBULIN PLAS-MCNC: 3.4 G/DL (ref 2.8–4.4)
GLUCOSE BLD-MCNC: 78 MG/DL (ref 70–99)
HCT VFR BLD AUTO: 39.1 % (ref 35–48)
HGB BLD-MCNC: 12.4 G/DL (ref 12–16)
IMM GRANULOCYTES # BLD AUTO: 0.02 X10(3) UL (ref 0–1)
IMM GRANULOCYTES NFR BLD: 0.4 %
LYMPHOCYTES # BLD AUTO: 1.38 X10(3) UL (ref 1–4)
LYMPHOCYTES NFR BLD AUTO: 25.5 %
M PROTEIN MFR SERPL ELPH: 6.7 G/DL (ref 6.4–8.2)
MCH RBC QN AUTO: 31.6 PG (ref 26–34)
MCHC RBC AUTO-ENTMCNC: 31.7 G/DL (ref 31–37)
MCV RBC AUTO: 99.5 FL (ref 80–100)
MONOCYTES # BLD AUTO: 0.79 X10(3) UL (ref 0.1–1)
MONOCYTES NFR BLD AUTO: 14.6 %
NEUTROPHILS # BLD AUTO: 3 X10 (3) UL (ref 1.5–7.7)
NEUTROPHILS # BLD AUTO: 3 X10(3) UL (ref 1.5–7.7)
NEUTROPHILS NFR BLD AUTO: 55.2 %
OSMOLALITY SERPL CALC.SUM OF ELEC: 291 MOSM/KG (ref 275–295)
PATIENT FASTING Y/N/NP: YES
PLATELET # BLD AUTO: 138 10(3)UL (ref 150–450)
POTASSIUM SERPL-SCNC: 4 MMOL/L (ref 3.5–5.1)
RBC # BLD AUTO: 3.93 X10(6)UL (ref 3.8–5.3)
SODIUM SERPL-SCNC: 140 MMOL/L (ref 136–145)
WBC # BLD AUTO: 5.4 X10(3) UL (ref 4–11)

## 2020-03-27 PROCEDURE — 85025 COMPLETE CBC W/AUTO DIFF WBC: CPT

## 2020-03-27 PROCEDURE — 80053 COMPREHEN METABOLIC PANEL: CPT

## 2020-03-27 PROCEDURE — 36415 COLL VENOUS BLD VENIPUNCTURE: CPT

## 2020-06-16 ENCOUNTER — LAB REQUISITION (OUTPATIENT)
Dept: LAB | Facility: HOSPITAL | Age: 80
End: 2020-06-16
Payer: MEDICARE

## 2020-06-16 DIAGNOSIS — Z11.59 ENCOUNTER FOR SCREENING FOR OTHER VIRAL DISEASES: ICD-10-CM

## 2020-06-18 LAB — SARS-COV-2 RNA RESP QL NAA+PROBE: NOT DETECTED

## 2020-06-29 ENCOUNTER — NURSE ONLY (OUTPATIENT)
Dept: LAB | Age: 80
End: 2020-06-29
Attending: INTERNAL MEDICINE
Payer: MEDICARE

## 2020-06-29 DIAGNOSIS — M62.81 MUSCLE WEAKNESS (GENERALIZED): ICD-10-CM

## 2020-06-29 DIAGNOSIS — Z85.6 PERSONAL HISTORY OF UNSPECIFIED LEUKEMIA: ICD-10-CM

## 2020-06-29 DIAGNOSIS — D68.52 HETEROZYGOUS FOR PROTHROMBIN G20210A MUTATION (HCC): Primary | ICD-10-CM

## 2020-06-29 DIAGNOSIS — K57.93: ICD-10-CM

## 2020-06-29 LAB
ALBUMIN SERPL-MCNC: 3.3 G/DL (ref 3.4–5)
ALBUMIN/GLOB SERPL: 1.1 {RATIO} (ref 1–2)
ALP LIVER SERPL-CCNC: 51 U/L (ref 55–142)
ALT SERPL-CCNC: 15 U/L (ref 13–56)
ANION GAP SERPL CALC-SCNC: 5 MMOL/L (ref 0–18)
AST SERPL-CCNC: 12 U/L (ref 15–37)
BASOPHILS # BLD AUTO: 0.05 X10(3) UL (ref 0–0.2)
BASOPHILS NFR BLD AUTO: 1 %
BILIRUB DIRECT SERPL-MCNC: 0.1 MG/DL (ref 0–0.2)
BILIRUB SERPL-MCNC: 0.5 MG/DL (ref 0.1–2)
BUN BLD-MCNC: 19 MG/DL (ref 7–18)
BUN/CREAT SERPL: 20.2 (ref 10–20)
CALCIUM BLD-MCNC: 8.6 MG/DL (ref 8.5–10.1)
CHLORIDE SERPL-SCNC: 107 MMOL/L (ref 98–112)
CO2 SERPL-SCNC: 30 MMOL/L (ref 21–32)
CREAT BLD-MCNC: 0.94 MG/DL (ref 0.55–1.02)
DEPRECATED RDW RBC AUTO: 45.6 FL (ref 35.1–46.3)
EOSINOPHIL # BLD AUTO: 0.1 X10(3) UL (ref 0–0.7)
EOSINOPHIL NFR BLD AUTO: 1.9 %
ERYTHROCYTE [DISTWIDTH] IN BLOOD BY AUTOMATED COUNT: 12.7 % (ref 11–15)
EST. AVERAGE GLUCOSE BLD GHB EST-MCNC: 103 MG/DL (ref 68–126)
FOLATE SERPL-MCNC: 11.5 NG/ML (ref 8.7–?)
GLOBULIN PLAS-MCNC: 3 G/DL (ref 2.8–4.4)
GLUCOSE BLD-MCNC: 68 MG/DL (ref 70–99)
HBA1C MFR BLD HPLC: 5.2 % (ref ?–5.7)
HCT VFR BLD AUTO: 38.1 % (ref 35–48)
HGB BLD-MCNC: 12.3 G/DL (ref 12–16)
IMM GRANULOCYTES # BLD AUTO: 0.03 X10(3) UL (ref 0–1)
IMM GRANULOCYTES NFR BLD: 0.6 %
LYMPHOCYTES # BLD AUTO: 1.62 X10(3) UL (ref 1–4)
LYMPHOCYTES NFR BLD AUTO: 30.8 %
M PROTEIN MFR SERPL ELPH: 6.3 G/DL (ref 6.4–8.2)
MCH RBC QN AUTO: 31.9 PG (ref 26–34)
MCHC RBC AUTO-ENTMCNC: 32.3 G/DL (ref 31–37)
MCV RBC AUTO: 99 FL (ref 80–100)
MONOCYTES # BLD AUTO: 0.68 X10(3) UL (ref 0.1–1)
MONOCYTES NFR BLD AUTO: 12.9 %
NEUTROPHILS # BLD AUTO: 2.78 X10 (3) UL (ref 1.5–7.7)
NEUTROPHILS # BLD AUTO: 2.78 X10(3) UL (ref 1.5–7.7)
NEUTROPHILS NFR BLD AUTO: 52.8 %
OSMOLALITY SERPL CALC.SUM OF ELEC: 295 MOSM/KG (ref 275–295)
PATIENT FASTING Y/N/NP: YES
PLATELET # BLD AUTO: 133 10(3)UL (ref 150–450)
POTASSIUM SERPL-SCNC: 3.7 MMOL/L (ref 3.5–5.1)
RBC # BLD AUTO: 3.85 X10(6)UL (ref 3.8–5.3)
SODIUM SERPL-SCNC: 142 MMOL/L (ref 136–145)
T4 FREE SERPL-MCNC: 0.9 NG/DL (ref 0.8–1.7)
TSI SER-ACNC: 14.4 MIU/ML (ref 0.36–3.74)
VIT B12 SERPL-MCNC: 530 PG/ML (ref 193–986)
VIT D+METAB SERPL-MCNC: 40.4 NG/ML (ref 30–100)
WBC # BLD AUTO: 5.3 X10(3) UL (ref 4–11)

## 2020-06-29 PROCEDURE — 85025 COMPLETE CBC W/AUTO DIFF WBC: CPT

## 2020-06-29 PROCEDURE — 82248 BILIRUBIN DIRECT: CPT

## 2020-06-29 PROCEDURE — 84439 ASSAY OF FREE THYROXINE: CPT

## 2020-06-29 PROCEDURE — 84443 ASSAY THYROID STIM HORMONE: CPT

## 2020-06-29 PROCEDURE — 83036 HEMOGLOBIN GLYCOSYLATED A1C: CPT

## 2020-06-29 PROCEDURE — 80053 COMPREHEN METABOLIC PANEL: CPT

## 2020-06-29 PROCEDURE — 82746 ASSAY OF FOLIC ACID SERUM: CPT

## 2020-06-29 PROCEDURE — 82607 VITAMIN B-12: CPT

## 2020-06-29 PROCEDURE — 82306 VITAMIN D 25 HYDROXY: CPT

## 2020-09-06 ENCOUNTER — LAB REQUISITION (OUTPATIENT)
Dept: LAB | Facility: HOSPITAL | Age: 80
End: 2020-09-06
Payer: MEDICARE

## 2020-09-06 DIAGNOSIS — U07.1 COVID-19: ICD-10-CM

## 2020-09-11 ENCOUNTER — NURSE ONLY (OUTPATIENT)
Dept: LAB | Age: 80
End: 2020-09-11
Attending: INTERNAL MEDICINE
Payer: MEDICARE

## 2020-09-11 DIAGNOSIS — K57.93: ICD-10-CM

## 2020-09-11 DIAGNOSIS — D68.52 HETEROZYGOUS FOR PROTHROMBIN G20210A MUTATION (HCC): Primary | ICD-10-CM

## 2020-09-11 LAB
ALBUMIN SERPL-MCNC: 3 G/DL (ref 3.4–5)
ALBUMIN/GLOB SERPL: 1 {RATIO} (ref 1–2)
ALP LIVER SERPL-CCNC: 55 U/L (ref 55–142)
ALT SERPL-CCNC: 12 U/L (ref 13–56)
ANION GAP SERPL CALC-SCNC: 5 MMOL/L (ref 0–18)
AST SERPL-CCNC: 12 U/L (ref 15–37)
BASOPHILS # BLD AUTO: 0.04 X10(3) UL (ref 0–0.2)
BASOPHILS NFR BLD AUTO: 0.7 %
BILIRUB SERPL-MCNC: 0.4 MG/DL (ref 0.1–2)
BUN BLD-MCNC: 19 MG/DL (ref 7–18)
BUN/CREAT SERPL: 25.7 (ref 10–20)
CALCIUM BLD-MCNC: 9.1 MG/DL (ref 8.5–10.1)
CHLORIDE SERPL-SCNC: 106 MMOL/L (ref 98–112)
CO2 SERPL-SCNC: 32 MMOL/L (ref 21–32)
CREAT BLD-MCNC: 0.74 MG/DL (ref 0.55–1.02)
DEPRECATED RDW RBC AUTO: 43.4 FL (ref 35.1–46.3)
EOSINOPHIL # BLD AUTO: 0.1 X10(3) UL (ref 0–0.7)
EOSINOPHIL NFR BLD AUTO: 1.8 %
ERYTHROCYTE [DISTWIDTH] IN BLOOD BY AUTOMATED COUNT: 11.7 % (ref 11–15)
GLOBULIN PLAS-MCNC: 3.1 G/DL (ref 2.8–4.4)
GLUCOSE BLD-MCNC: 71 MG/DL (ref 70–99)
HCT VFR BLD AUTO: 37 % (ref 35–48)
HGB BLD-MCNC: 11.4 G/DL (ref 12–16)
IMM GRANULOCYTES # BLD AUTO: 0.01 X10(3) UL (ref 0–1)
IMM GRANULOCYTES NFR BLD: 0.2 %
LYMPHOCYTES # BLD AUTO: 1.79 X10(3) UL (ref 1–4)
LYMPHOCYTES NFR BLD AUTO: 31.6 %
M PROTEIN MFR SERPL ELPH: 6.1 G/DL (ref 6.4–8.2)
MCH RBC QN AUTO: 31.1 PG (ref 26–34)
MCHC RBC AUTO-ENTMCNC: 30.8 G/DL (ref 31–37)
MCV RBC AUTO: 100.8 FL (ref 80–100)
MONOCYTES # BLD AUTO: 0.89 X10(3) UL (ref 0.1–1)
MONOCYTES NFR BLD AUTO: 15.7 %
NEUTROPHILS # BLD AUTO: 2.83 X10 (3) UL (ref 1.5–7.7)
NEUTROPHILS # BLD AUTO: 2.83 X10(3) UL (ref 1.5–7.7)
NEUTROPHILS NFR BLD AUTO: 50 %
OSMOLALITY SERPL CALC.SUM OF ELEC: 297 MOSM/KG (ref 275–295)
PATIENT FASTING Y/N/NP: YES
PLATELET # BLD AUTO: 125 10(3)UL (ref 150–450)
POTASSIUM SERPL-SCNC: 3.7 MMOL/L (ref 3.5–5.1)
RBC # BLD AUTO: 3.67 X10(6)UL (ref 3.8–5.3)
SODIUM SERPL-SCNC: 143 MMOL/L (ref 136–145)
T4 FREE SERPL-MCNC: 1.2 NG/DL (ref 0.8–1.7)
TSI SER-ACNC: 0.3 MIU/ML (ref 0.36–3.74)
WBC # BLD AUTO: 5.7 X10(3) UL (ref 4–11)

## 2020-09-11 PROCEDURE — 84443 ASSAY THYROID STIM HORMONE: CPT

## 2020-09-11 PROCEDURE — 84439 ASSAY OF FREE THYROXINE: CPT

## 2020-09-11 PROCEDURE — 85025 COMPLETE CBC W/AUTO DIFF WBC: CPT

## 2020-09-11 PROCEDURE — 80053 COMPREHEN METABOLIC PANEL: CPT

## 2020-10-08 PROBLEM — R63.4 WEIGHT LOSS: Status: ACTIVE | Noted: 2017-06-23

## 2020-10-08 PROBLEM — K21.9 GERD (GASTROESOPHAGEAL REFLUX DISEASE): Status: ACTIVE | Noted: 2020-10-08

## 2020-10-08 PROBLEM — I16.0 HYPERTENSIVE URGENCY: Status: ACTIVE | Noted: 2017-06-21

## 2020-10-08 PROBLEM — G20 DEMENTIA DUE TO PARKINSON'S DISEASE WITHOUT BEHAVIORAL DISTURBANCE (HCC): Status: ACTIVE | Noted: 2017-02-02

## 2020-10-08 PROBLEM — M15.9 PRIMARY OSTEOARTHRITIS INVOLVING MULTIPLE JOINTS: Status: ACTIVE | Noted: 2017-02-02

## 2020-10-08 PROBLEM — R11.2 INTRACTABLE NAUSEA AND VOMITING: Status: ACTIVE | Noted: 2017-06-21

## 2020-10-08 PROBLEM — R09.82 POST-NASAL DRIP: Status: ACTIVE | Noted: 2018-04-30

## 2020-10-08 PROBLEM — F02.81 DEMENTIA ASSOCIATED WITH OTHER UNDERLYING DISEASE WITH BEHAVIORAL DISTURBANCE (HCC): Status: ACTIVE | Noted: 2020-10-08

## 2020-10-08 PROBLEM — M25.512 LEFT SHOULDER PAIN: Status: ACTIVE | Noted: 2017-05-23

## 2020-10-08 PROBLEM — D69.6 THROMBOCYTOPENIA, UNSPECIFIED (HCC): Status: ACTIVE | Noted: 2017-06-21

## 2020-10-08 PROBLEM — S02.92XD CLOSED FRACTURE OF FACIAL BONE WITH ROUTINE HEALING: Status: ACTIVE | Noted: 2017-02-02

## 2020-10-08 PROBLEM — M70.21 OLECRANON BURSITIS OF RIGHT ELBOW: Status: ACTIVE | Noted: 2017-05-23

## 2020-10-08 PROBLEM — R42 DIZZINESS: Status: ACTIVE | Noted: 2017-06-20

## 2020-10-08 PROBLEM — F02.80 DEMENTIA DUE TO PARKINSON'S DISEASE WITHOUT BEHAVIORAL DISTURBANCE (HCC): Status: ACTIVE | Noted: 2017-02-02

## 2020-10-22 ENCOUNTER — NURSE ONLY (OUTPATIENT)
Dept: LAB | Age: 80
End: 2020-10-22
Attending: INTERNAL MEDICINE
Payer: MEDICARE

## 2020-10-22 DIAGNOSIS — R52 GENERALIZED PAIN: Primary | ICD-10-CM

## 2020-10-22 DIAGNOSIS — M62.81 MUSCLE WEAKNESS (GENERALIZED): ICD-10-CM

## 2020-10-22 PROCEDURE — 85025 COMPLETE CBC W/AUTO DIFF WBC: CPT

## 2020-10-22 PROCEDURE — 80053 COMPREHEN METABOLIC PANEL: CPT

## 2020-10-22 PROCEDURE — 84439 ASSAY OF FREE THYROXINE: CPT

## 2020-10-22 PROCEDURE — 84443 ASSAY THYROID STIM HORMONE: CPT

## 2020-10-24 ENCOUNTER — NURSE ONLY (OUTPATIENT)
Dept: LAB | Age: 80
End: 2020-10-24
Attending: INTERNAL MEDICINE
Payer: MEDICARE

## 2020-10-24 DIAGNOSIS — N07.1: Primary | ICD-10-CM

## 2020-10-24 PROCEDURE — 86140 C-REACTIVE PROTEIN: CPT

## 2020-10-24 PROCEDURE — 82728 ASSAY OF FERRITIN: CPT

## 2020-10-24 PROCEDURE — 85025 COMPLETE CBC W/AUTO DIFF WBC: CPT

## 2020-10-24 PROCEDURE — 83615 LACTATE (LD) (LDH) ENZYME: CPT

## 2020-10-24 PROCEDURE — 85379 FIBRIN DEGRADATION QUANT: CPT

## 2020-10-24 PROCEDURE — 80053 COMPREHEN METABOLIC PANEL: CPT

## 2020-10-27 ENCOUNTER — NURSE ONLY (OUTPATIENT)
Dept: LAB | Age: 80
End: 2020-10-27
Attending: INTERNAL MEDICINE
Payer: MEDICARE

## 2020-10-27 DIAGNOSIS — R69 DIAGNOSIS UNKNOWN: Primary | ICD-10-CM

## 2020-10-27 PROCEDURE — 82728 ASSAY OF FERRITIN: CPT

## 2020-10-27 PROCEDURE — 86140 C-REACTIVE PROTEIN: CPT

## 2020-10-27 PROCEDURE — 80053 COMPREHEN METABOLIC PANEL: CPT

## 2020-10-27 PROCEDURE — 85025 COMPLETE CBC W/AUTO DIFF WBC: CPT

## 2020-10-27 PROCEDURE — 83615 LACTATE (LD) (LDH) ENZYME: CPT

## 2020-10-27 PROCEDURE — 85379 FIBRIN DEGRADATION QUANT: CPT

## 2020-11-06 ENCOUNTER — NURSE ONLY (OUTPATIENT)
Dept: LAB | Age: 80
End: 2020-11-06
Attending: INTERNAL MEDICINE
Payer: MEDICARE

## 2020-11-06 DIAGNOSIS — U07.1 INFECTION DUE TO 2019-NCOV: Primary | ICD-10-CM

## 2020-11-06 PROCEDURE — 85025 COMPLETE CBC W/AUTO DIFF WBC: CPT

## 2020-11-06 PROCEDURE — 85379 FIBRIN DEGRADATION QUANT: CPT

## 2020-11-06 PROCEDURE — 86140 C-REACTIVE PROTEIN: CPT

## 2020-11-06 PROCEDURE — 83615 LACTATE (LD) (LDH) ENZYME: CPT

## 2020-11-06 PROCEDURE — 80053 COMPREHEN METABOLIC PANEL: CPT

## 2020-11-09 ENCOUNTER — NURSE ONLY (OUTPATIENT)
Dept: LAB | Age: 80
End: 2020-11-09
Attending: INTERNAL MEDICINE
Payer: MEDICARE

## 2020-11-09 DIAGNOSIS — M62.81 MUSCLE WEAKNESS: Primary | ICD-10-CM

## 2020-11-09 PROCEDURE — 80053 COMPREHEN METABOLIC PANEL: CPT

## 2020-11-09 PROCEDURE — 36415 COLL VENOUS BLD VENIPUNCTURE: CPT

## 2020-11-09 PROCEDURE — 85025 COMPLETE CBC W/AUTO DIFF WBC: CPT

## 2020-11-12 PROBLEM — U07.1 COVID-19 VIRUS INFECTION: Status: ACTIVE | Noted: 2020-11-12

## 2021-03-05 DIAGNOSIS — Z23 NEED FOR VACCINATION: ICD-10-CM

## (undated) NOTE — ED AVS SNAPSHOT
Tricia Benitez   MRN: [id number]    Department:  [facility] Emergency Department   Date of Visit:  10/20/2019           Disclosure     Insurance plans vary and the physician(s) referred by the ER may not be covered by your plan.  Please contact you tell this physician (or your personal doctor if your instructions are to return to your personal doctor) about any new or lasting problems. The primary care or specialist physician will see patients referred from the [facility] Emergency Department.  Salvadore Skiff

## (undated) NOTE — ED AVS SNAPSHOT
Grady Terry   MRN: [id number]    Department:  [facility] Emergency Department   Date of Visit:  10/20/2017           Disclosure     Insurance plans vary and the physician(s) referred by the ER may not be covered by your plan.  Please contact you If you have been prescribed any medication(s), please fill your prescription right away and begin taking the medication(s) as directed    If the emergency physician has read X-rays, these will be re-interpreted by a radiologist.  If there is a significant

## (undated) NOTE — ED AVS SNAPSHOT
[facility] Emergency Department    [place]  [address] [address]    Phone:  [phone]    Fax:  [phone]   MRN: [id number]    Department:  [facility] Emergency Department   Date of Visit:  1/24/ IF THERE IS ANY CHANGE OR WORSENING OF YOUR CONDITION, CALL YOUR PRIMARY CARE PHYSICIAN AT ONCE OR RETURN IMMEDIATELY TO THE EMERGENCY DEPARTMENT.     If you have been prescribed any medication(s), please fill your prescription right away and begin taking t